# Patient Record
Sex: FEMALE | Race: WHITE | URBAN - METROPOLITAN AREA
[De-identification: names, ages, dates, MRNs, and addresses within clinical notes are randomized per-mention and may not be internally consistent; named-entity substitution may affect disease eponyms.]

---

## 2017-05-10 ENCOUNTER — TELEPHONE (OUTPATIENT)
Dept: INTERNAL MEDICINE CLINIC | Facility: CLINIC | Age: 52
End: 2017-05-10

## 2017-05-10 NOTE — TELEPHONE ENCOUNTER
Pt states she met up with a couple people last night. She had 2.5 small margaritas. She states that when she awoke this morning she felt more intoxicated than she felt she should be feeling and could not account for 2-3 hours in the night.  Pt thinks she mi

## 2017-05-10 NOTE — TELEPHONE ENCOUNTER
Spoke with Dr. Milvia Samayoa whom advises pt report to ED. I called pt back and re-iterated that she should go to the ED for testing. Pt states that she will not go to the ED as she cannot afford it.  Pt states it is not an emergency if the testing is not time-sensitiv

## 2017-05-10 NOTE — TELEPHONE ENCOUNTER
[phone]  Pt requesting an order. Pt would not tell me what order she needs exactly. Wants to speak with a Rn.  Will be going to [facility] lab  To clinical

## 2022-12-01 ENCOUNTER — OFFICE VISIT (OUTPATIENT)
Dept: INTERNAL MEDICINE CLINIC | Facility: CLINIC | Age: 57
End: 2022-12-01
Payer: COMMERCIAL

## 2022-12-01 VITALS
WEIGHT: 133 LBS | TEMPERATURE: 98 F | OXYGEN SATURATION: 97 % | HEIGHT: 63.6 IN | BODY MASS INDEX: 22.99 KG/M2 | HEART RATE: 64 BPM | DIASTOLIC BLOOD PRESSURE: 66 MMHG | SYSTOLIC BLOOD PRESSURE: 108 MMHG

## 2022-12-01 DIAGNOSIS — Z86.69 HISTORY OF MIGRAINE: ICD-10-CM

## 2022-12-01 DIAGNOSIS — Z00.00 ROUTINE HEALTH MAINTENANCE: Primary | ICD-10-CM

## 2022-12-01 DIAGNOSIS — F41.9 ANXIETY: ICD-10-CM

## 2022-12-01 DIAGNOSIS — Z78.0 POSTMENOPAUSE: ICD-10-CM

## 2022-12-01 DIAGNOSIS — K20.0 EOSINOPHILIC ESOPHAGITIS: ICD-10-CM

## 2022-12-01 DIAGNOSIS — Z12.31 ENCOUNTER FOR SCREENING MAMMOGRAM FOR MALIGNANT NEOPLASM OF BREAST: ICD-10-CM

## 2022-12-01 PROCEDURE — 3074F SYST BP LT 130 MM HG: CPT | Performed by: INTERNAL MEDICINE

## 2022-12-01 PROCEDURE — 3078F DIAST BP <80 MM HG: CPT | Performed by: INTERNAL MEDICINE

## 2022-12-01 PROCEDURE — 3008F BODY MASS INDEX DOCD: CPT | Performed by: INTERNAL MEDICINE

## 2022-12-01 PROCEDURE — 99386 PREV VISIT NEW AGE 40-64: CPT | Performed by: INTERNAL MEDICINE

## 2023-05-31 ENCOUNTER — TELEPHONE (OUTPATIENT)
Dept: INTERNAL MEDICINE CLINIC | Facility: CLINIC | Age: 58
End: 2023-05-31

## 2023-05-31 NOTE — TELEPHONE ENCOUNTER
Lab orders faxed to [facility] on [address] at [phone]-[id number] recieved

## 2023-05-31 NOTE — TELEPHONE ENCOUNTER
Pt has an appt tomorrow morning at [facility] on [facility] in [place]  Requests orders are faxed to [employer]

## 2023-06-02 LAB
ABSOLUTE BASOPHILS: 20 CELLS/UL (ref 0–200)
ABSOLUTE EOSINOPHILS: 199 CELLS/UL (ref 15–500)
ABSOLUTE LYMPHOCYTES: 1397 CELLS/UL (ref 850–3900)
ABSOLUTE MONOCYTES: 367 CELLS/UL (ref 200–950)
ABSOLUTE NEUTROPHILS: 3116 CELLS/UL (ref 1500–7800)
ALBUMIN/GLOBULIN RATIO: 1.5 (CALC) (ref 1–2.5)
ALBUMIN: 4.4 G/DL (ref 3.6–5.1)
ALKALINE PHOSPHATASE: 88 U/L (ref 37–153)
ALT: 14 U/L (ref 6–29)
AST: 15 U/L (ref 10–35)
BASOPHILS: 0.4 %
BILIRUBIN, TOTAL: 0.7 MG/DL (ref 0.2–1.2)
BUN: 24 MG/DL (ref 7–25)
CALCIUM: 9.3 MG/DL (ref 8.6–10.4)
CARBON DIOXIDE: 26 MMOL/L (ref 20–32)
CHLORIDE: 106 MMOL/L (ref 98–110)
CHOL/HDLC RATIO: 4.8 (CALC)
CHOLESTEROL, TOTAL: 215 MG/DL
CREATININE: 0.65 MG/DL (ref 0.5–1.03)
EGFR: 102 ML/MIN/1.73M2
EOSINOPHILS: 3.9 %
GLOBULIN: 2.9 G/DL (CALC) (ref 1.9–3.7)
GLUCOSE: 95 MG/DL (ref 65–99)
HDL CHOLESTEROL: 45 MG/DL
HEMATOCRIT: 39.5 % (ref 35–45)
HEMOGLOBIN: 13.1 G/DL (ref 11.7–15.5)
LDL-CHOLESTEROL: 145 MG/DL (CALC)
LYMPHOCYTES: 27.4 %
MCH: 29.2 PG (ref 27–33)
MCHC: 33.2 G/DL (ref 32–36)
MCV: 88.2 FL (ref 80–100)
MONOCYTES: 7.2 %
MPV: 9.8 FL (ref 7.5–12.5)
NEUTROPHILS: 61.1 %
NON-HDL CHOLESTEROL: 170 MG/DL (CALC)
PLATELET COUNT: 292 THOUSAND/UL (ref 140–400)
POTASSIUM: 4 MMOL/L (ref 3.5–5.3)
PROTEIN, TOTAL: 7.3 G/DL (ref 6.1–8.1)
RDW: 12.6 % (ref 11–15)
RED BLOOD CELL COUNT: 4.48 MILLION/UL (ref 3.8–5.1)
SODIUM: 141 MMOL/L (ref 135–146)
TRIGLYCERIDES: 125 MG/DL
TSH: 2.97 MIU/L (ref 0.4–4.5)
VITAMIN D, 25-OH, TOTAL: 35 NG/ML (ref 30–100)
WHITE BLOOD CELL COUNT: 5.1 THOUSAND/UL (ref 3.8–10.8)

## 2023-07-26 ENCOUNTER — NURSE ONLY (OUTPATIENT)
Dept: ALLERGY | Facility: CLINIC | Age: 58
End: 2023-07-26

## 2023-07-26 ENCOUNTER — OFFICE VISIT (OUTPATIENT)
Dept: ALLERGY | Facility: CLINIC | Age: 58
End: 2023-07-26

## 2023-07-26 VITALS
OXYGEN SATURATION: 97 % | HEART RATE: 69 BPM | WEIGHT: 143 LBS | DIASTOLIC BLOOD PRESSURE: 60 MMHG | SYSTOLIC BLOOD PRESSURE: 109 MMHG | HEIGHT: 63.6 IN | BODY MASS INDEX: 24.71 KG/M2

## 2023-07-26 DIAGNOSIS — J30.89 SEASONAL AND PERENNIAL ALLERGIC RHINOCONJUNCTIVITIS: Primary | ICD-10-CM

## 2023-07-26 DIAGNOSIS — J30.2 SEASONAL AND PERENNIAL ALLERGIC RHINOCONJUNCTIVITIS: Primary | ICD-10-CM

## 2023-07-26 DIAGNOSIS — K21.9 GASTROESOPHAGEAL REFLUX DISEASE, UNSPECIFIED WHETHER ESOPHAGITIS PRESENT: ICD-10-CM

## 2023-07-26 DIAGNOSIS — H10.10 SEASONAL AND PERENNIAL ALLERGIC RHINOCONJUNCTIVITIS: Primary | ICD-10-CM

## 2023-07-26 DIAGNOSIS — J34.2 NASAL SEPTAL DEVIATION: ICD-10-CM

## 2023-07-26 DIAGNOSIS — Z92.29 COVID-19 VACCINE SERIES COMPLETED: ICD-10-CM

## 2023-07-26 DIAGNOSIS — K20.0 EOSINOPHILIC ESOPHAGITIS: ICD-10-CM

## 2023-07-26 PROCEDURE — 3078F DIAST BP <80 MM HG: CPT | Performed by: ALLERGY & IMMUNOLOGY

## 2023-07-26 PROCEDURE — 95024 IQ TESTS W/ALLERGENIC XTRCS: CPT | Performed by: ALLERGY & IMMUNOLOGY

## 2023-07-26 PROCEDURE — 3008F BODY MASS INDEX DOCD: CPT | Performed by: ALLERGY & IMMUNOLOGY

## 2023-07-26 PROCEDURE — 95004 PERQ TESTS W/ALRGNC XTRCS: CPT | Performed by: ALLERGY & IMMUNOLOGY

## 2023-07-26 PROCEDURE — 3074F SYST BP LT 130 MM HG: CPT | Performed by: ALLERGY & IMMUNOLOGY

## 2023-07-26 PROCEDURE — 99204 OFFICE O/P NEW MOD 45 MIN: CPT | Performed by: ALLERGY & IMMUNOLOGY

## 2023-07-26 RX ORDER — FLUCONAZOLE 150 MG/1
150 TABLET ORAL ONCE
COMMUNITY

## 2023-07-26 RX ORDER — FLUTICASONE PROPIONATE 50 MCG
2 SPRAY, SUSPENSION (ML) NASAL DAILY
Qty: 3 EACH | Refills: 1 | Status: SHIPPED | OUTPATIENT
Start: 2023-07-26

## 2023-07-26 RX ORDER — LEVOCETIRIZINE DIHYDROCHLORIDE 5 MG/1
5 TABLET, FILM COATED ORAL EVERY EVENING
Qty: 90 TABLET | Refills: 1 | Status: SHIPPED | OUTPATIENT
Start: 2023-07-26

## 2023-07-26 RX ORDER — METRONIDAZOLE 500 MG/1
250 TABLET ORAL 3 TIMES DAILY
COMMUNITY

## 2023-07-26 RX ORDER — ESOMEPRAZOLE MAGNESIUM 40 MG/1
40 CAPSULE, DELAYED RELEASE ORAL
Qty: 30 CAPSULE | Refills: 0 | Status: SHIPPED | OUTPATIENT
Start: 2023-07-26

## 2023-07-26 NOTE — PATIENT INSTRUCTIONS
1 allergic rhinitis  Year-round in nature  See above skin testing to screen for allergic triggers  Reviewed avoidance measures and potential treatment option immunotherapy  Symptoms also complicated by nasal septal deviation. No prior surgery. Flonase 2 sprays per nostril once a day for best use on a daily basis prevent symptoms  Xyzal, levocetirizine 5 mg once a day as needed for symptoms of runny nose sneezing itchy watery eyes  May add pseudoephedrine 120 mg every 12 hours as needed if refractory for congestion or consider Singulair, montelukast 10 mg once a day. FDA warning reviewed for Singulair  May also consider nasal saline sinus rinses  May consider CT of sinuses if not improving with above recommendations especially if allergy testing is negative    #2 eosinophilic esophagitis  Last seen by GI in 2010 and last scope was at that time. No current PPI or GERD medications. Recent symptoms this year including dysphagia. No food impaction no ED visits. Reviewed EOE  Reviewed most common triggers are reflux and allergies. May consider 1 month trial of Nexium to see if symptoms improve  If symptoms do not improve then would recommend that he follow-up with GI to see if an additional stricture is forming  See above skin testing to screen for common food allergens  Reviewed most common food triggers even with a negative allergy test are milk and wheat. Patient is milk free at this time. #3 nasal septal deviation  Prior broken nose in the past no recent ENT evaluation. May consider reevaluation by ENT if willing to have nasal septal repair performed          #4 COVID-vaccine x3 doses. Recommend booster with biValent last dose was February 2022      #5 COVID-vaccine up-to-date x3 doses.   First dose was with Edward Bauer    #6 recommend flu vaccine in the fall [FreeTextEntry1] : Likely otitis externa with possible involvement of TM

## 2023-07-26 NOTE — PROGRESS NOTES
Patient is a 20-year-old female who presents for allergy evaluation with a chief complaint of allergies Philippe Newton is a 62year old female. HPI:   Patient presents with: Allergies: Patient presents for allergies and states she also has EOE,  gets pressure and headache when drinking alcohol, has watery eyes. Patient is a 20-year-old female who presents for allergy evaluation with a chief complaint of allergies      COVID-vaccine x3 doses. First dose with Kike Mesa    Today patient reports       Allergies   Duration: years   Timing: YR  Symptoms: nc, sinus congestion, left eye watering, sinus ha and pressure  pnd , > rn,   Better in [place]   Severity: moderate  Watches dogs often   Status: worsening   Triggers: allergies  Tried: claritin-d prn , flonase prn    Pets or smokers: nonsmoker     Prior ent eval:    Prior nasal fx in 30's , suspect nsd , no prior sx   Prior sinus imaging : denies     Hx of asthma, ad, or food allergy:  denies     Hx of eoe .  2007  Sees gi   Last egd 2010, dilation  Recent dysphagia intermittent , hiccup ,   No food impaction   No current ppi   No milk   Ok with oat milk     HISTORY:  Past Medical History:   Diagnosis Date    Chronic headache     Chronic UTI     frequent uti's    Esophageal reflux     Lipid screening 9/17/2009    per NG    Wears contact lenses       Past Surgical History:   Procedure Laterality Date    D & C  2007    miscarriage    LUMPECTOMY LEFT Left 2008    TONSILLECTOMY  1976      Family History   Problem Relation Age of Onset    Lipids Mother         hyperlipidemia    Hypertension Mother     Diabetes Other     Lipids Other         hyperlipidemia    Lipids Maternal Aunt         hyperlipidemia    Lipids Maternal Aunt         hyperlipidemia    Dementia Paternal Aunt     Breast Cancer Maternal Grandfather       Social History:   Social History     Socioeconomic History    Marital status:    Tobacco Use    Smoking status: Never     Passive exposure: Never   Substance and Sexual Activity    Alcohol use: Yes     Alcohol/week: 5.0 standard drinks of alcohol     Types: 5 Standard drinks or equivalent per week     Comment: 1 drink wine daily    Drug use: No   Other Topics Concern    Caffeine Concern Yes     Comment: coffee        Medications (Active prior to today's visit):  Current Outpatient Medications   Medication Sig Dispense Refill    Probiotic Product (PROBIOTIC DAILY OR) Take 1 tablet by mouth daily. Ascorbic Acid (VITAMIN C) 1000 MG Oral Tab Take 1 tablet (1,000 mg total) by mouth daily. multivitamin Oral Tab Take 1 tablet by mouth daily. fluconazole (DIFLUCAN) 150 MG Oral Tab Take 1 tablet (150 mg total) by mouth once. (Patient not taking: Reported on 7/26/2023)      metRONIDAZOLE (FLAGYL) 500 MG Oral Tab Take 0.5 tablets (250 mg total) by mouth 3 (three) times daily.  (Patient not taking: Reported on 7/26/2023)         Allergies:  No Known Allergies      ROS:     Allergic/Immuno:  See HPI  Cardiovascular:  Negative for irregular heartbeat/palpitations, chest pain, edema  Constitutional:  Negative night sweats,weight loss, irritability and lethargy  Endocrine:  Negative for cold intolerance, polydipsia and polyphagia  ENMT:  Negative for ear drainage, hearing loss see hpi   Eyes:  Negative for eye discharge and vision loss  Gastrointestinal:  Negative for abdominal pain, diarrhea and vomiting see hpi   Genitourinary:  Negative for dysuria and hematuria  Hema/Lymph:  Negative for easy bleeding and easy bruising  Integumentary:  Negative for pruritus and rash  Musculoskeletal:  Negative for joint symptoms  Neurological:  Negative for dizziness, seizures  Psychiatric:  Negative for inappropriate interaction and psychiatric symptoms  Respiratory:   see hpi       PHYSICAL EXAM:   Constitutional: responsive, no acute distress noted  Head/Face: NC/Atraumatic  Eyes/Vision: conjunctiva and lids are normal extraocular motion is intact Ears/Audiometry: tympanic membranes are normal bilaterally hearing is grossly intact  Nose/Mouth/Throat: nose and throat are clear mucous membranes are moist  _+ nc, + nsd   Neck/Thyroid: neck is supple without adenopathy  Lymphatic: no abnormal cervical, supraclavicular or axillary adenopathy is noted  Respiratory: normal to inspection lungs are clear to auscultation bilaterally normal respiratory effort   Cardiovascular: regular rate and rhythm no murmurs, gallups, or rubs  Abdomen: soft non-tender non-distended  Skin/Hair: no unusual rashes present  Extremities: no edema, cyanosis, or clubbing  Neurological:Oriented to time, place, person & situation       ASSESSMENT/PLAN:   Assessment   Seasonal and perennial allergic rhinoconjunctivitis  (primary encounter diagnosis)  Nasal septal deviation  Covid-19 vaccine series completed  Eosinophilic esophagitis  Gastroesophageal reflux disease, unspecified whether esophagitis present    Skin testing today to common indoor and outdoor environmental allergies was negative       Skin testing today to common food allergy including milk egg wheat soy almond walnut peanut was negative       Positive histamine control      #1 allergic rhinitis  Year-round in nature  See above skin testing to screen for allergic triggers  Reviewed avoidance measures and potential treatment option immunotherapy  Symptoms also complicated by nasal septal deviation. No prior surgery. Flonase 2 sprays per nostril once a day for best use on a daily basis prevent symptoms  Xyzal, levocetirizine 5 mg once a day as needed for symptoms of runny nose sneezing itchy watery eyes  May add pseudoephedrine 120 mg every 12 hours as needed if refractory for congestion or consider Singulair, montelukast 10 mg once a day.   FDA warning reviewed for Singulair  May also consider nasal saline sinus rinses  May consider CT of sinuses if not improving with above recommendations especially if allergy testing is negative    #2 eosinophilic esophagitis  Last seen by GI in 2010 and last scope was at that time. No current PPI or GERD medications. Recent symptoms this year including dysphagia. No food impaction no ED visits. Reviewed EOE  Reviewed most common triggers are reflux and allergies. May consider 1 month trial of Nexium to see if symptoms improve  If symptoms do not improve then would recommend that he follow-up with GI to see if an additional stricture is forming  See above skin testing to screen for common food allergens  Reviewed most common food triggers even with a negative allergy test are milk and wheat. Patient is milk free at this time. #3 nasal septal deviation  Prior broken nose in the past no recent ENT evaluation. May consider reevaluation by ENT if willing to have nasal septal repair performed          #4 COVID-vaccine x3 doses. Recommend booster with biValent last dose was February 2022      #5 COVID-vaccine up-to-date x3 doses. First dose was with Dellia Confer    #6 recommend flu vaccine in the fall       Orders This Visit:  No orders of the defined types were placed in this encounter. Meds This Visit:  Requested Prescriptions      No prescriptions requested or ordered in this encounter       Imaging & Referrals:  None     7/26/2023  Pasquale Thao MD      If medication samples were provided today, they were provided solely for patient education and training related to self administration of these medications. Teaching, instruction and sample was provided to the patient by myself. Teaching included  a review of potential adverse side effects as well as potential efficacy. Patient's questions were answered in regards to medication administration and dosing and potential side effects.  Teaching was provided via the teach back method

## 2023-10-30 ENCOUNTER — LAB ENCOUNTER (OUTPATIENT)
Dept: LAB | Facility: HOSPITAL | Age: 58
End: 2023-10-30
Attending: DERMATOLOGY
Payer: COMMERCIAL

## 2023-11-18 RX ORDER — ESOMEPRAZOLE MAGNESIUM 40 MG/1
40 CAPSULE, DELAYED RELEASE ORAL
Qty: 30 CAPSULE | Refills: 0 | Status: SHIPPED | OUTPATIENT
Start: 2023-11-18

## 2023-11-18 NOTE — TELEPHONE ENCOUNTER
Requested Prescriptions   Pending Prescriptions Disp Refills    ESOMEPRAZOLE MAGNESIUM 40 MG Oral Capsule Delayed Release [Pharmacy Med Name: Esomeprazole Magnesium Oral Capsule Delayed Release 40 MG] 30 capsule 0     Sig: TAKE ONE CAPSULE BY MOUTH EVERY MORNING BEFORE BREAKFAST       There is no refill protocol information for this order        Pt last seen on 7/26/2023. Refilled per med refill protocol.

## 2024-05-07 RX ORDER — FLUTICASONE PROPIONATE 50 MCG
2 SPRAY, SUSPENSION (ML) NASAL DAILY
Qty: 3 EACH | Refills: 1 | Status: SHIPPED | OUTPATIENT
Start: 2024-05-07

## 2024-05-07 RX ORDER — MULTIVIT WITH MINERALS/LUTEIN
1000 TABLET ORAL DAILY
Refills: 0 | OUTPATIENT
Start: 2024-05-07

## 2024-05-07 RX ORDER — DOXEPIN HYDROCHLORIDE 50 MG/1
1 CAPSULE ORAL DAILY
Refills: 0 | OUTPATIENT
Start: 2024-05-07

## 2024-05-07 RX ORDER — ESOMEPRAZOLE MAGNESIUM 40 MG/1
40 CAPSULE, DELAYED RELEASE ORAL
Qty: 30 CAPSULE | Refills: 0 | Status: SHIPPED | OUTPATIENT
Start: 2024-05-07

## 2024-05-07 NOTE — TELEPHONE ENCOUNTER
Requested Prescriptions     Refused Prescriptions Disp Refills    Ascorbic Acid (VITAMIN C) 1000 MG Oral Tab  0     Sig: Take 1 tablet (1,000 mg total) by mouth daily.     Refused By: KATIE ANDERSON     Reason for Refusal: Medication never prescribed for patient    multivitamin Oral Tab  0     Sig: Take 1 tablet by mouth daily.     Refused By: KATIE ANDERSON     Reason for Refusal: Medication never prescribed for patient      Refill requests denied as there medications do not require a prescription, they are over-the-counter medications.

## 2024-05-07 NOTE — TELEPHONE ENCOUNTER
Requested Prescriptions   Pending Prescriptions Disp Refills    fluticasone propionate 50 MCG/ACT Nasal Suspension 3 each 1     Si sprays by Nasal route daily.       Antihistamines Passed - 2024  1:23 PM        Passed - Appt in past 12 mos or next 1 mos     Recent Outpatient Visits              9 months ago Seasonal and perennial allergic rhinoconjunctivitis    [facility]    Nurse Only    9 months ago Seasonal and perennial allergic rhinoconjunctivitis    [facility]    Office Visit    1 year ago Routine health maintenance    [facility], Derrick Chu Elaine, MD    Office Visit    7 years ago Routine health maintenance    [facility], Derrick Steel Elaine, MD    Office Visit                        Esomeprazole Magnesium 40 MG Oral Capsule Delayed Release 30 capsule 0     Sig: Take 1 capsule (40 mg total) by mouth before breakfast.       There is no refill protocol information for this order        Refilled per med refill protocol.

## 2024-09-17 RX ORDER — ESOMEPRAZOLE MAGNESIUM 40 MG/1
40 CAPSULE, DELAYED RELEASE ORAL
Qty: 30 CAPSULE | Refills: 0 | OUTPATIENT
Start: 2024-09-17

## 2024-09-17 NOTE — TELEPHONE ENCOUNTER
Refill requested for:  Name from pharmacy: Esomeprazole Magnesium Oral Capsule Delayed Release 40 MG          Will file in chart as: ESOMEPRAZOLE MAGNESIUM 40 MG Oral Capsule Delayed Release    Sig: take 1 capsule by mouth before breakfast    Disp: 30 capsule    Refills: 0    Start: 9/17/2024    Class: Normal    Non-formulary    Last ordered: 4 months ago (5/7/2024) by Denis Jones MD    Last refill: 5/15/2024    Rx #: [id number]       To be filled at: [facility] # [address] [place] [address] [phone], [phone]       Last office visit: 7/26/2023    Previously advised to follow up in: not listed in last office visit note    F/U currently scheduled? No      Date of last refill:     ACTION: RN called pt  to notify her that she is overdue for yearly follow up since she was last seen in July. Pt declines wanting to schedule a follow up at this time. RN advised that she may consider purchasing over the counter. Pt ended phone call.

## 2025-04-29 ENCOUNTER — OFFICE VISIT (OUTPATIENT)
Dept: UROLOGY | Facility: HOSPITAL | Age: 60
End: 2025-04-29
Attending: OBSTETRICS & GYNECOLOGY
Payer: COMMERCIAL

## 2025-04-29 VITALS — BODY MASS INDEX: 23.68 KG/M2 | HEIGHT: 63.6 IN | RESPIRATION RATE: 18 BRPM | WEIGHT: 137 LBS

## 2025-04-29 DIAGNOSIS — N94.10 DYSPAREUNIA IN FEMALE: Primary | ICD-10-CM

## 2025-04-29 DIAGNOSIS — N39.3 FEMALE STRESS INCONTINENCE: ICD-10-CM

## 2025-04-29 DIAGNOSIS — Z87.440 HISTORY OF RECURRENT UTIS: ICD-10-CM

## 2025-04-29 DIAGNOSIS — N95.2 ATROPHIC VAGINITIS: ICD-10-CM

## 2025-04-29 DIAGNOSIS — R82.90 ABNORMAL URINALYSIS: ICD-10-CM

## 2025-04-29 DIAGNOSIS — Z86.19 HISTORY OF HERPES GENITALIS: ICD-10-CM

## 2025-04-29 LAB
BILIRUB UR QL: NEGATIVE
COLOR UR: YELLOW
CONTROL RUN WITHIN 24 HOURS?: YES
GLUCOSE UR-MCNC: NORMAL MG/DL
HGB UR QL STRIP.AUTO: NEGATIVE
KETONES UR-MCNC: NEGATIVE MG/DL
LEUKOCYTE ESTERASE UR QL STRIP.AUTO: 75
NITRITE URINE: POSITIVE
PH UR: 5.5 [PH] (ref 5–8)
PROT UR-MCNC: NEGATIVE MG/DL
SP GR UR STRIP: 1.02 (ref 1–1.03)
UROBILINOGEN UR STRIP-ACNC: NORMAL

## 2025-04-29 PROCEDURE — 81001 URINALYSIS AUTO W/SCOPE: CPT | Performed by: OBSTETRICS & GYNECOLOGY

## 2025-04-29 PROCEDURE — 87086 URINE CULTURE/COLONY COUNT: CPT | Performed by: OBSTETRICS & GYNECOLOGY

## 2025-04-29 PROCEDURE — 51798 US URINE CAPACITY MEASURE: CPT

## 2025-04-29 PROCEDURE — 87186 SC STD MICRODIL/AGAR DIL: CPT | Performed by: OBSTETRICS & GYNECOLOGY

## 2025-04-29 PROCEDURE — 87088 URINE BACTERIA CULTURE: CPT | Performed by: OBSTETRICS & GYNECOLOGY

## 2025-04-29 PROCEDURE — 99202 OFFICE O/P NEW SF 15 MIN: CPT

## 2025-04-29 PROCEDURE — 81002 URINALYSIS NONAUTO W/O SCOPE: CPT | Performed by: OBSTETRICS & GYNECOLOGY

## 2025-04-29 RX ORDER — VALACYCLOVIR HYDROCHLORIDE 500 MG/1
1000 TABLET, FILM COATED ORAL EVERY 12 HOURS
Qty: 12 TABLET | Refills: 3 | Status: SHIPPED | OUTPATIENT
Start: 2025-04-29 | End: 2025-05-02

## 2025-04-29 RX ORDER — ESTRADIOL 0.1 MG/G
CREAM VAGINAL
Qty: 42.5 G | Refills: 3 | Status: SHIPPED | OUTPATIENT
Start: 2025-04-29

## 2025-04-29 NOTE — PROGRESS NOTES
ID: Gladys Schulte  : 3/3/1965  Date: 2025       Chief Complaint   Patient presents with    Other     Pain with intercourse         HPI:  60 year old female, G1,  Vaginal deliveries 0, who self referred. She reports pain with intercourse for the past 5 years.   No prior treatments. Has not been sexually active in many years.   She is menopausal.  Has a uterus. Not on estrogen.  Bowels are regular.  At last 3 times in the past has experienced tissue protrusion per rectum, but this has not happened in a long time.   No vaginal bulge symptoms.    Has had LETITIA and UUI, which is longstanding.  Remote history of UTIs but not recently. Thinks UTIs were related to sexual activity in the past.   No UTI symptoms today.   Has h/o genital HSV. Uses Valtrex for flares. Needs rx.  Has not had physical in several years. No recent mammograms. Has never had colonoscopy.  Works as a .       PMHx: Migraines, GERD, seasonal allergies.      Urogynecology Summary:  Urogynecology Summary  Prolapse: No (Reports Rectal Bulge symptoms)  LETITIA: Yes  Urge Incontinence: Yes  Nocturia Frequency: 0  Frequency: 2 hours  Incomplete emptying: No  Constipation: No  Wears pad day?: 0  Wears Pad Night?: 0  Activities are limited by UI/POP?: Yes (Reports limiting heavy lifting)  Currently Sexually Active: No  Avoids sexual activity due to: Pain (Reports use of lubrication)          HISTORY:  Past Medical History[1]   Past Surgical History[2]   Family History[3]   Short Social Hx on File[4]     Allergies:  Allergies[5]    Medications:  Medications Prior to Visit[6]    Review of Systems:    A comprehensive 12 point review of systems was completed.  Pertinent positives noted in the the HPI.  Denies CP  Denies SOB    Vitals:  Resp 18   Ht 63.6\"   Wt 137 lb (62.1 kg)   LMP  (LMP Unknown)   BMI 23.81 kg/m²        GENERAL EXAM:  GENERAL:  Alert and oriented. Well-nourished, normally developed.  Thought and emotional status are  appropriate, speech is understandable.  No acute distress.   HEAD: Normocephalic and atraumatic with normal hair distribution  LUNGS:  Normal respiratory effort.    ABDOMEN: Non tender to palpation, tone normal without rigidity or guarding, no masses present, no evidence of hernia.   EXTREMITIES:  Without edema, varicosities or lesions.   SKIN:  Warm and dry, with good color and turgor. No lesions.    PELVIC EXAM: The patient consented verbally to the pelvic exam and procedures as needed. The chaperone present and witnessing exam was LINDA Rose  External Genitalia: Normal appearance for age. + atrophy, no lesions  Urethra: + atrophy, non tender  Bladder:no fullness, no tender  Vagina: + atrophy, no lesions, + levator tenderness.    Cervix: no bleeding, no lesions, non tender  Uterus: soft, mobile, non tender  Adnexa:no masses, non tender  Perineum: non tender  Anus: no hemorrhoids. No evidence of anorectal prolapse.  Rectum: deferred.     PELVIS FLOOR NEUROMUSCULAR FUNCTION:  Strength:  Tender  Perineal Sensation:  Normal      PELVIC SUPPORT:  [place]:  0  Ant:  0  Post:  0  CST:  negative  UVJ: not hypermobile      The patient voided 100 ml in the privacy of the bathroom.  This was collected as a clean catch urine specimen and sent for C&S. Urine dip + nitrates.     Procedure bladder scan -[id number]:   The indication for this procedure is reflected in the history of present illness section.  The patient was placed in the supine position.  Ultrasound transmission gel was applied to the bladder scan machine probe. The probe was placed 1 cm above the pubic bone in proper orientation and aimed toward the bladder. The probe was positioned so that the bladder was in the cross hairs of the aiming screen.  The largest volume was recorded.  Scanned volume:  19 mL.       Impression/Plan:    ICD-10-CM    1. Dyspareunia in female  N94.10       2. Female stress incontinence  N39.3 POCT urinalysis dipstick[60458]     Urine Culture,  Routine     Urinalysis, Routine     PHYSICAL THERAPY - [facility]     estradiol (ESTRACE) 0.1 MG/GM Vaginal Cream      3. Atrophic vaginitis  N95.2       4. History of recurrent UTIs  Z87.440       5. Abnormal urinalysis  R82.90       6. History of herpes genitalis  Z86.19           Discussion Items:   Behavioral and pharmacologic treatments for OAB  Nonsurgical and surgical treatments for Stress Urinary Incontinence  Pelvic muscle rehabilitation including pelvic floor PT  Topical estrogen therapy for treating UGA  Behavioral and medical treatment for recurrent UTIs  Discussed dietary and behavioral modification, discussed pharmacologic and nonpharmacologic mgmt options for urinary symptoms. Discussed dietary & weight management with potential improvements in symptoms with weight loss.    Diagnostic Items:  Urine C&S    Medications Discussed:  Estrace vaginal cream 1 gram per vagina x 3 per week.   Valtrex 1,000 mg PO bid x 3 days prn flare-ups.    Treatment Plan, Non-surgical:   Pelvic floor PT. Referral provided.     Treatment Plan, Surgical:   None    Pt verbalizes understanding of all above discussed information. Recommend to re establish care with PCP.      Follow up in 4 months or sooner prn.       Zohra Mckeon MD, FACOG, FACS  Urogynecology and Reconstructive Pelvic Surgery            [1]   Past Medical History:   Chronic headache    Chronic UTI    frequent uti's    Esophageal reflux    Lipid screening    per NG    Wears contact lenses   [2]   Past Surgical History:  Procedure Laterality Date    D & c  2007    miscarriage    Lumpectomy left Left 2008    Tonsillectomy  1976   [3]   Family History  Problem Relation Age of Onset    Lipids Mother         hyperlipidemia    Hypertension Mother     Diabetes Other     Lipids Other         hyperlipidemia    Lipids Maternal Aunt         hyperlipidemia    Lipids Maternal Aunt         hyperlipidemia    Dementia Paternal Aunt     Breast Cancer Maternal  Grandfather    [4]   Social History  Socioeconomic History    Marital status:    Tobacco Use    Smoking status: Never     Passive exposure: Never   Substance and Sexual Activity    Alcohol use: Yes     Alcohol/week: 5.0 standard drinks of alcohol     Types: 5 Standard drinks or equivalent per week     Comment: 1 drink wine daily    Drug use: No   Other Topics Concern    Caffeine Concern Yes     Comment: coffee   [5] No Known Allergies  [6]   Outpatient Medications Prior to Visit   Medication Sig Dispense Refill    Probiotic Product (PROBIOTIC DAILY OR) Take 1 tablet by mouth in the morning.      Ascorbic Acid (VITAMIN C) 1000 MG Oral Tab Take 1 tablet (1,000 mg total) by mouth in the morning.      multivitamin Oral Tab Take 1 tablet by mouth in the morning.      fluticasone propionate 50 MCG/ACT Nasal Suspension 2 sprays by Nasal route daily. (Patient not taking: Reported on 4/29/2025) 3 each 1    Esomeprazole Magnesium 40 MG Oral Capsule Delayed Release Take 1 capsule (40 mg total) by mouth before breakfast. (Patient not taking: Reported on 4/29/2025) 30 capsule 0    fluconazole (DIFLUCAN) 150 MG Oral Tab Take 1 tablet (150 mg total) by mouth once. (Patient not taking: Reported on 4/29/2025)      metRONIDAZOLE (FLAGYL) 500 MG Oral Tab Take 0.5 tablets (250 mg total) by mouth 3 (three) times daily. (Patient not taking: Reported on 4/29/2025)      levocetirizine 5 MG Oral Tab Take 1 tablet (5 mg total) by mouth every evening. (Patient not taking: Reported on 4/29/2025) 90 tablet 1     No facility-administered medications prior to visit.

## 2025-05-01 ENCOUNTER — TELEPHONE (OUTPATIENT)
Dept: UROLOGY | Facility: HOSPITAL | Age: 60
End: 2025-05-01

## 2025-05-01 DIAGNOSIS — N39.0 ACUTE UTI: Primary | ICD-10-CM

## 2025-05-01 RX ORDER — NITROFURANTOIN 25; 75 MG/1; MG/1
100 CAPSULE ORAL 2 TIMES DAILY
Qty: 14 CAPSULE | Refills: 0 | Status: SHIPPED | OUTPATIENT
Start: 2025-05-01 | End: 2025-05-08

## 2025-05-01 NOTE — TELEPHONE ENCOUNTER
Telephone call to patient with test results  No answer.  Detailed message left with information below.    Ucx + for 50-99k E-coli, resistance x 3    Frequent UTI's No  Currently on suppression? Y No    Allergies reviewed    Discussed with VINCENT Jaime  mg po bid x 7 days    Will send antibiotics to pt's listed pharmacy  Encouraged to call us to verify that she received this message, and to address any questions or concerns.  Will follow.    Addendum:  IC from patient stating she received the information above, but had some questions.  All questions answered.  Verbalizes understanding and agrees to plan.

## 2025-05-22 ENCOUNTER — LAB ENCOUNTER (OUTPATIENT)
Dept: LAB | Facility: HOSPITAL | Age: 60
End: 2025-05-22
Attending: OBSTETRICS & GYNECOLOGY
Payer: COMMERCIAL

## 2025-05-22 ENCOUNTER — TELEPHONE (OUTPATIENT)
Dept: UROLOGY | Facility: HOSPITAL | Age: 60
End: 2025-05-22

## 2025-05-22 DIAGNOSIS — N39.0 ACUTE UTI: Primary | ICD-10-CM

## 2025-05-22 DIAGNOSIS — N39.0 ACUTE UTI: ICD-10-CM

## 2025-05-22 PROCEDURE — 87086 URINE CULTURE/COLONY COUNT: CPT

## 2025-05-22 RX ORDER — NITROFURANTOIN 25; 75 MG/1; MG/1
100 CAPSULE ORAL 2 TIMES DAILY
Qty: 14 CAPSULE | Refills: 0 | Status: SHIPPED | OUTPATIENT
Start: 2025-05-22 | End: 2025-05-29

## 2025-05-22 NOTE — TELEPHONE ENCOUNTER
Incoming telephone call received from patient.     Patient complains of UTI signs and symptoms including, constant pressure, and frequency x 2 days.  Reports recently completing course of Macrobid x 6 days. \"Dropped 2 pills on floor. Unable to find\"  Reports recently on \"girls trip consuming alcohol\"    Denies fever    Allergies and previous cultures reviewed    Hx incomplete emptying: No    Not taking suppression.    Using Estrace: Yes - 3 x per week    Recent ucxs:     4/29/25 50-99k E-coli  R-amp, cipro and levaquin  Tx-NTF bid x 7 days    Will discussed with Dr. Mckeon Macrobid 100 mg PO bid x 7 days    Urine culture ordered, Will follow    Empiric antibiotics sent to patient's preferred pharmacy     Encouraged PO hydration.    Avoid bladder irritants    All questions answered    Encouraged to call if symptoms worsen or fail to improve.    If urine culture positive. Will need appointment with PA to discuss UTI's     Patient understands and agrees to plan

## 2025-05-23 ENCOUNTER — TELEPHONE (OUTPATIENT)
Dept: UROLOGY | Facility: HOSPITAL | Age: 60
End: 2025-05-23

## 2025-05-23 NOTE — TELEPHONE ENCOUNTER
Outgoing telephone call to patient with test results.   Urine culture obtained on 5/22/25 showed no growth.   No treatment is necessary at this time. Suggested patient to stop Empiric antibiotics.   Encouraged PO hydration   Bowel regimen reviewed.   Encouraged patient to call the office with questions or concerns at [phone].  All questions answered.   Patient verbalized understanding and agreed to plan.